# Patient Record
(demographics unavailable — no encounter records)

---

## 2018-01-10 NOTE — RESULT NOTES
Verified Results  (1) CBC/PLT/DIFF [id number] 11:48AM [facility] Order Number: [id number]     Test Name Result Flag Reference   WBC COUNT 7 01 Thousand/uL  4 31-10 16   RBC COUNT 4 05 Million/uL  3 81-5 12   HEMOGLOBIN 10 6 g/dL L 11 5-15 4   HEMATOCRIT 32 8 % L 34 8-46  1   MCV 81 fL L 82-98   MCH 26 2 pg L 26 8-34 3   MCHC 32 3 g/dL  31 4-37 4   RDW 15 2 % H 11 6-15 1   MPV 8 9 fL  8 9-12 7   PLATELET COUNT 135 Thousands/uL H 149-390   nRBC AUTOMATED 0 /100 WBCs     NEUTROPHILS RELATIVE PERCENT 55 %  43-75   LYMPHOCYTES RELATIVE PERCENT 35 %  14-44   MONOCYTES RELATIVE PERCENT 7 %  4-12   EOSINOPHILS RELATIVE PERCENT 2 %  0-6   BASOPHILS RELATIVE PERCENT 1 %  0-1   NEUTROPHILS ABSOLUTE COUNT 3 81 Thousands/?L  1 85-7 62   LYMPHOCYTES ABSOLUTE COUNT 2 48 Thousands/?L  0 60-4 47   MONOCYTES ABSOLUTE COUNT 0 46 Thousand/?L  0 17-1 22   EOSINOPHILS ABSOLUTE COUNT 0 17 Thousand/?L  0 00-0 61   BASOPHILS ABSOLUTE COUNT 0 07 Thousands/?L  0 00-0 10   - Patient Instructions: This bloodwork is non-fasting  Please drink two glasses of water morning of bloodwork  - Patient Instructions: This bloodwork is non-fasting  Please drink two glasses of water morning of bloodwork  (1) COMPREHENSIVE METABOLIC PANEL [id number] 65:98WH [facility] Order Number: [id number]     Test Name Result Flag Reference   GLUCOSE,RANDM 95 mg/dL     If the patient is fasting, the ADA then defines impaired fasting glucose as > 100 mg/dL and diabetes as > or equal to 123 mg/dL     SODIUM 136 mmol/L  136-145   POTASSIUM 3 9 mmol/L  3 5-5 3   CHLORIDE 100 mmol/L  100-108   CARBON DIOXIDE 29 mmol/L  21-32   ANION GAP (CALC) 7 mmol/L  4-13   BLOOD UREA NITROGEN 11 mg/dL  5-25   CREATININE 0 82 mg/dL  0 60-1 30   Standardized to IDMS reference method   CALCIUM 9 0 mg/dL  8 3-10 1   BILI, TOTAL 0 57 mg/dL  0 20-1 00   ALK PHOSPHATAS 75 U/L     ALT (SGPT) 27 U/L  12-78   AST(SGOT) 23 U/L  5-45   ALBUMIN 3 8 g/dL  3 5-5 0   TOTAL PROTEIN 8 5 g/dL H 6 4-8 2   eGFR Non-African American      >60 0 ml/min/1 73sq m   - Patient Instructions: This is a fasting blood test  Water,black tea or black  coffee only after 9:00pm the night before test Drink 2 glasses of water the morning of test   National Kidney Disease Education Program recommendations are as follows:  GFR calculation is accurate only with a steady state creatinine  Chronic Kidney disease less than 60 ml/min/1 73 sq  meters  Kidney failure less than 15 ml/min/1 73 sq  meters  (1) LIPID PANEL, FASTING [id number] 11:48AM Batsheva Schwab Order Number: [id number]     Test Name Result Flag Reference   CHOLESTEROL 123 mg/dL     HDL,DIRECT 38 mg/dL L 40-60   Specimen collection should occur prior to Metamizole administration due to the potential for falsely depressed results  LDL CHOLESTEROL CALCULATED 71 mg/dL  0-100   - Patient Instructions: This is a fasting blood test  Water,black tea or black  coffee only after 9:00pm the night before test   Drink 2 glasses of water the morning of test     - Patient Instructions: This is a fasting blood test  Water,black tea or black  coffee only after 9:00pm the night before test Drink 2 glasses of water the morning of test   Triglyceride:         Normal              <150 mg/dl       Borderline High    150-199 mg/dl       High               200-499 mg/dl       Very High          >499 mg/dl  Cholesterol:         Desirable        <200 mg/dl      Borderline High  200-239 mg/dl      High             >239 mg/dl  HDL Cholesterol:        High    >59 mg/dL      Low     <41 mg/dL  LDL CALCULATED:    This screening LDL is a calculated result  It does not have the accuracy of the Direct Measured LDL in the monitoring of patients with hyperlipidemia and/or statin therapy  Direct Measure LDL (LEW917) must be ordered separately in these patients     TRIGLYCERIDES 70 mg/dL  <=150   Specimen collection should occur prior to N-Acetylcysteine or Metamizole administration due to the potential for falsely depressed results  (1) MICROALBUMIN CREATININE RATIO, RANDOM URINE 2016 11:48AM Natasha Bells Order Number: [id number]     Test Name Result Flag Reference   MICROALBUMIN/ CREAT R 63 mg/g creatinine H 0-30   MICROALBUMIN,URINE 57 5 mg/L H 0 0-20 0   CREATININE URINE 90 6 mg/dL       (1) TSH WITH FT4 REFLEX 2016 11:48AM Natasha  Order Number: [id number]     Test Name Result Flag Reference   TSH 1 070 uIU/mL  0 358-3 740   - Patient Instructions: This is a fasting blood test  Water,black tea or black  coffee only after 9:00pm the night before test Drink 2 glasses of water the morning of test   Patients undergoing fluorescein dye angiography may retain small amounts of fluorescein in the body for 48-72 hours post procedure  Samples containing fluorescein can produce falsely depressed TSH values  If the patient had this procedure,a specimen should be resubmitted post fluorescein clearance            The recommended reference ranges for TSH during pregnancy are as follows:  First trimester 0 1 to 2 5 uIU/mL  Second trimester  0 2 to 3 0 uIU/mL  Third trimester 0 3 to 3 0 uIU/m       Plan  Anemia    · Iron 325 (65 Fe) MG Oral Tablet; TAKE 1 TABLET TWICE DAILY WITH MEALS   · Vitamin C 500 MG Oral Tablet; TAKE 1 TABLETS BID   · Hemoccult Screening - POC; Status:Active - Perform Order; Requested [id number];

## 2018-01-16 NOTE — RESULT NOTES
Verified Results  (1) OCCULT BLOOD, FECES(DIAG) [id number] 09:56AM Malinda Abarca     Test Name Result Flag Reference   FECAL OCCULT BLOOD DIAGNOSTIC Negative  Negative